# Patient Record
Sex: MALE | Race: WHITE | Employment: OTHER | URBAN - NONMETROPOLITAN AREA
[De-identification: names, ages, dates, MRNs, and addresses within clinical notes are randomized per-mention and may not be internally consistent; named-entity substitution may affect disease eponyms.]

---

## 2017-12-18 ENCOUNTER — HOSPITAL ENCOUNTER (EMERGENCY)
Age: 77
Discharge: HOME | End: 2017-12-19
Attending: EMERGENCY MEDICINE
Payer: COMMERCIAL

## 2017-12-18 VITALS
WEIGHT: 200 LBS | HEIGHT: 69 IN | TEMPERATURE: 98.4 F | BODY MASS INDEX: 29.62 KG/M2 | HEART RATE: 74 BPM | DIASTOLIC BLOOD PRESSURE: 81 MMHG | OXYGEN SATURATION: 93 % | SYSTOLIC BLOOD PRESSURE: 173 MMHG | RESPIRATION RATE: 16 BRPM

## 2017-12-18 DIAGNOSIS — T78.40XA ALLERGIC REACTION, INITIAL ENCOUNTER: Primary | ICD-10-CM

## 2017-12-18 PROCEDURE — 99283 EMERGENCY DEPT VISIT LOW MDM: CPT

## 2017-12-19 PROCEDURE — 6370000000 HC RX 637 (ALT 250 FOR IP): Performed by: EMERGENCY MEDICINE

## 2017-12-19 RX ORDER — PREDNISONE 20 MG/1
40 TABLET ORAL ONCE
Status: COMPLETED | OUTPATIENT
Start: 2017-12-19 | End: 2017-12-19

## 2017-12-19 RX ORDER — PREDNISONE 10 MG/1
TABLET ORAL
Qty: 20 TABLET | Refills: 0 | Status: SHIPPED | OUTPATIENT
Start: 2017-12-19 | End: 2017-12-29

## 2017-12-19 RX ORDER — CLINDAMYCIN HYDROCHLORIDE 150 MG/1
300 CAPSULE ORAL ONCE
Status: COMPLETED | OUTPATIENT
Start: 2017-12-19 | End: 2017-12-19

## 2017-12-19 RX ORDER — FAMOTIDINE 20 MG/1
40 TABLET, FILM COATED ORAL ONCE
Status: COMPLETED | OUTPATIENT
Start: 2017-12-19 | End: 2017-12-19

## 2017-12-19 RX ORDER — DIPHENHYDRAMINE HCL 25 MG
25 TABLET ORAL ONCE
Status: COMPLETED | OUTPATIENT
Start: 2017-12-19 | End: 2017-12-19

## 2017-12-19 RX ORDER — CLINDAMYCIN HYDROCHLORIDE 150 MG/1
300 CAPSULE ORAL 3 TIMES DAILY
Qty: 42 CAPSULE | Refills: 0 | Status: SHIPPED | OUTPATIENT
Start: 2017-12-19 | End: 2017-12-26

## 2017-12-19 RX ADMIN — PREDNISONE 40 MG: 20 TABLET ORAL at 00:10

## 2017-12-19 RX ADMIN — FAMOTIDINE 40 MG: 20 TABLET, FILM COATED ORAL at 00:10

## 2017-12-19 RX ADMIN — DIPHENHYDRAMINE HCL 25 MG: 25 TABLET ORAL at 00:10

## 2017-12-19 RX ADMIN — CLINDAMYCIN HYDROCHLORIDE 300 MG: 150 CAPSULE ORAL at 00:10

## 2017-12-19 ASSESSMENT — ENCOUNTER SYMPTOMS
VOMITING: 0
ABDOMINAL PAIN: 0
DIARRHEA: 0
NAUSEA: 0
SHORTNESS OF BREATH: 0
SORE THROAT: 0

## 2017-12-19 NOTE — ED PROVIDER NOTES
[facility] ED  [address] Patience Soriano  Phone: [phone]    eMERGENCY dEPARTMENT eNCOUnter          Pt Name: Fred Cobb  MRN: [id number]  [place] 1940  Date of evaluation: 12/18/2017      CHIEF COMPLAINT       Chief Complaint   Patient presents with    Oral Swelling     swelling to left lower lip for 4-5 hours. slight tingling also. denies weakness otherwise. no facial droop noted. started osteobiflex about 5 days ago. HISTORY OF PRESENT ILLNESS              Fred Cobb is a 69 y.o. male who presents with lower lip edema. States since he worse on the left. States it began about 4 hours prior to arrival.  Has not taken anything for the symptoms. Denies any neurologic symptoms to the head or extremities. Denies any trauma. States he did start taking a new medication about 5 days ago. Also reports switching to a new hand soap 2 days ago. Denies any difficulty breathing or swallowing. Denies any tongue edema. States his symptoms are primarily to his left lip. Denies any other symptoms or concerns. REVIEW OF SYSTEMS         Review of Systems   Constitutional: Negative for chills and fever. HENT: Negative for sore throat. Respiratory: Negative for shortness of breath. Cardiovascular: Negative for chest pain. Gastrointestinal: Negative for abdominal pain, diarrhea, nausea and vomiting. Genitourinary: Negative for difficulty urinating and dysuria. Musculoskeletal: Negative for neck pain. Skin: Negative for rash. Neurological: Negative for weakness and numbness. All other systems reviewed and are negative. PAST MEDICAL HISTORY    has a past medical history of Cellulitis; Chronic left SI joint pain; Diastasis recti; Hip bursitis; Left hip pain; Left leg pain; Osteoarthritis of spine; Pars defect of lumbar spine; Somatic dysfunction of lumbar region; and Ventral hernia.     SURGICAL HISTORY      has a past surgical history that includes Colonoscopy (2013); Cholecystectomy (2012); and hernia repair (2010). CURRENT MEDICATIONS       Discharge Medication List as of 2017 12:09 AM      CONTINUE these medications which have NOT CHANGED    Details   Misc Natural Products (OSTEO BI-FLEX TRIPLE STRENGTH PO) Take 1 tablet by mouth dailyHistorical Med      Multiple Vitamins-Minerals (OCUVITE EYE HEALTH FORMULA PO) Take 1 tablet by mouth daily      aspirin 81 MG tablet Take 81 mg by mouth daily. levothyroxine (SYNTHROID) 112 MCG tablet   Take 112 mcg by mouth daily              ALLERGIES     has No Known Allergies. FAMILY HISTORY     indicated that his mother is . He indicated that his father is . He indicated that all of his three sisters are alive. He indicated that his brother is alive. family history includes Cancer (age of onset: 58) in his mother; Heart Disease in his father; Other in his father. SOCIAL HISTORY      reports that he has never smoked. He has never used smokeless tobacco. He reports that he does not drink alcohol or use drugs. PHYSICAL EXAM     INITIAL VITALS:  height is 5' 8.5\" (1.74 m) and weight is 200 lb (90.7 kg). His tympanic temperature is 98.4 °F (36.9 °C). His blood pressure is 173/81 (abnormal) and his pulse is 74. His respiration is 16 and oxygen saturation is 93%. Physical Exam   Constitutional: He is oriented to person, place, and time and well-developed, well-nourished, and in no distress. He appears not lethargic, to not be writhing in pain, not malnourished and not dehydrated. He appears healthy. Non-toxic appearance. He does not have a sickly appearance. No distress. HENT:   Head: Normocephalic and atraumatic.    Right Ear: Tympanic membrane, external ear and ear canal normal.   Left Ear: Tympanic membrane, external ear and ear canal normal.   Nose: Nose normal.   Mouth/Throat: Uvula is midline, oropharynx is clear and moist and Disposition    Improved    PATIENT REFERRED TO:  Marlon [email or web address]  Kyle, [address]  [phone]    Schedule an appointment as soon as possible for a visit in 2 days        DISCHARGE MEDICATIONS:  Discharge Medication List as of 12/19/2017 12:09 AM      START taking these medications    Details   predniSONE (DELTASONE) 10 MG tablet Take 4 tablets by mouth once daily for 5 days, Disp-20 tablet, R-0Print      clindamycin (CLEOCIN) 150 MG capsule Take 2 capsules by mouth 3 times daily for 7 days, Disp-42 capsule, R-0Print             (Please note that portions of this note were completed with a voice recognition program.  Efforts were made to edit the dictations but occasionally words are mis-transcribed.)    Ashley Goodwin DO  Attending Emergency Physician            Ashley Goodwin DO  12/19/17 0023

## 2017-12-19 NOTE — ED NOTES
Discharge instructions and rx x2 given. Pt verbalized understanding. Home ambulatory with son. condition stable.      Minoo Daniels RN  12/19/17 0020

## 2022-10-11 ENCOUNTER — HOSPITAL ENCOUNTER (INPATIENT)
Age: 82
LOS: 2 days | Discharge: HOME | DRG: 195 | End: 2022-10-13
Attending: EMERGENCY MEDICINE | Admitting: INTERNAL MEDICINE
Payer: COMMERCIAL

## 2022-10-11 ENCOUNTER — APPOINTMENT (OUTPATIENT)
Dept: GENERAL RADIOLOGY | Age: 82
DRG: 195 | End: 2022-10-11
Payer: COMMERCIAL

## 2022-10-11 ENCOUNTER — APPOINTMENT (OUTPATIENT)
Dept: CT IMAGING | Age: 82
DRG: 195 | End: 2022-10-11
Payer: COMMERCIAL

## 2022-10-11 ENCOUNTER — APPOINTMENT (OUTPATIENT)
Dept: NON INVASIVE DIAGNOSTICS | Age: 82
DRG: 195 | End: 2022-10-11
Payer: COMMERCIAL

## 2022-10-11 DIAGNOSIS — E03.9 HYPOTHYROIDISM, UNSPECIFIED TYPE: ICD-10-CM

## 2022-10-11 DIAGNOSIS — J18.9 PNEUMONIA OF BOTH LUNGS DUE TO INFECTIOUS ORGANISM, UNSPECIFIED PART OF LUNG: Primary | ICD-10-CM

## 2022-10-11 LAB
ABSOLUTE EOS #: 0.46 K/UL (ref 0–0.44)
ABSOLUTE IMMATURE GRANULOCYTE: 0.06 K/UL (ref 0–0.3)
ABSOLUTE LYMPH #: 1.42 K/UL (ref 1.1–3.7)
ABSOLUTE MONO #: 0.34 K/UL (ref 0.1–1.2)
ALBUMIN SERPL-MCNC: 4.4 G/DL (ref 3.5–5.2)
ALBUMIN/GLOBULIN RATIO: 2.1 (ref 1–2.5)
ALP BLD-CCNC: 42 U/L (ref 40–129)
ALT SERPL-CCNC: 37 U/L (ref 5–41)
ANION GAP SERPL CALCULATED.3IONS-SCNC: 9 MMOL/L (ref 9–17)
AST SERPL-CCNC: 51 U/L
BACTERIA: NORMAL
BASOPHILS # BLD: 2 % (ref 0–2)
BASOPHILS ABSOLUTE: 0.09 K/UL (ref 0–0.2)
BILIRUB SERPL-MCNC: 0.8 MG/DL (ref 0.3–1.2)
BILIRUBIN URINE: NEGATIVE
BUN BLDV-MCNC: 15 MG/DL (ref 8–23)
BUN/CREAT BLD: 12 (ref 9–20)
CALCIUM SERPL-MCNC: 9.5 MG/DL (ref 8.6–10.4)
CHLORIDE BLD-SCNC: 101 MMOL/L (ref 98–107)
CO2: 28 MMOL/L (ref 20–31)
CREAT SERPL-MCNC: 1.29 MG/DL (ref 0.7–1.2)
EKG ATRIAL RATE: 63 BPM
EKG ATRIAL RATE: 67 BPM
EKG P AXIS: 15 DEGREES
EKG P AXIS: 40 DEGREES
EKG P-R INTERVAL: 220 MS
EKG P-R INTERVAL: 224 MS
EKG Q-T INTERVAL: 424 MS
EKG Q-T INTERVAL: 438 MS
EKG QRS DURATION: 108 MS
EKG QRS DURATION: 110 MS
EKG QTC CALCULATION (BAZETT): 448 MS
EKG QTC CALCULATION (BAZETT): 448 MS
EKG R AXIS: -18 DEGREES
EKG R AXIS: -21 DEGREES
EKG T AXIS: -2 DEGREES
EKG T AXIS: 1 DEGREES
EKG VENTRICULAR RATE: 63 BPM
EKG VENTRICULAR RATE: 67 BPM
EOSINOPHILS RELATIVE PERCENT: 8 % (ref 1–4)
EPITHELIAL CELLS UA: NORMAL /HPF (ref 0–5)
GFR SERPL CREATININE-BSD FRML MDRD: 56 ML/MIN/1.73M2
GLUCOSE BLD-MCNC: 93 MG/DL (ref 70–99)
GLUCOSE URINE: NEGATIVE
HCT VFR BLD CALC: 31.9 % (ref 40.7–50.3)
HEMOGLOBIN: 10.5 G/DL (ref 13–17)
IMMATURE GRANULOCYTES: 1 %
KETONES, URINE: NEGATIVE
LACTIC ACID, SEPSIS: 0.8 MMOL/L (ref 0.5–1.9)
LEUKOCYTE ESTERASE, URINE: NEGATIVE
LV EF: 60 %
LVEF MODALITY: NORMAL
LYMPHOCYTES # BLD: 24 % (ref 24–43)
MAGNESIUM: 2.3 MG/DL (ref 1.6–2.6)
MCH RBC QN AUTO: 31.3 PG (ref 25.2–33.5)
MCHC RBC AUTO-ENTMCNC: 32.9 G/DL (ref 25.2–33.5)
MCV RBC AUTO: 95.2 FL (ref 82.6–102.9)
MONOCYTES # BLD: 6 % (ref 3–12)
NITRITE, URINE: NEGATIVE
NRBC AUTOMATED: 0 PER 100 WBC
PDW BLD-RTO: 15.2 % (ref 11.8–14.4)
PH UA: 6 (ref 5–6)
PLATELET # BLD: 229 K/UL (ref 138–453)
PMV BLD AUTO: 9 FL (ref 8.1–13.5)
POTASSIUM SERPL-SCNC: 3.4 MMOL/L (ref 3.7–5.3)
POTASSIUM SERPL-SCNC: 3.6 MMOL/L (ref 3.7–5.3)
PROTEIN UA: ABNORMAL
RBC # BLD: 3.35 M/UL (ref 4.21–5.77)
RBC # BLD: ABNORMAL 10*6/UL
RBC UA: NORMAL /HPF (ref 0–4)
SARS-COV-2, RAPID: NOT DETECTED
SEG NEUTROPHILS: 59 % (ref 36–65)
SEGMENTED NEUTROPHILS ABSOLUTE COUNT: 3.67 K/UL (ref 1.5–8.1)
SODIUM BLD-SCNC: 138 MMOL/L (ref 135–144)
SPECIFIC GRAVITY UA: 1.03 (ref 1.01–1.02)
SPECIMEN DESCRIPTION: NORMAL
TOTAL PROTEIN: 6.5 G/DL (ref 6.4–8.3)
TROPONIN, HIGH SENSITIVITY: 48 NG/L (ref 0–22)
TROPONIN, HIGH SENSITIVITY: 49 NG/L (ref 0–22)
TROPONIN, HIGH SENSITIVITY: 53 NG/L (ref 0–22)
TROPONIN, HIGH SENSITIVITY: 56 NG/L (ref 0–22)
TSH SERPL DL<=0.05 MIU/L-ACNC: 201.2 UIU/ML (ref 0.3–5)
URINE HGB: NEGATIVE
UROBILINOGEN, URINE: NORMAL
WBC # BLD: 6 K/UL (ref 3.5–11.3)
WBC UA: NORMAL /HPF (ref 0–4)

## 2022-10-11 PROCEDURE — 81001 URINALYSIS AUTO W/SCOPE: CPT

## 2022-10-11 PROCEDURE — 2709999900 CT CHEST PULMONARY EMBOLISM W CONTRAST

## 2022-10-11 PROCEDURE — 99222 1ST HOSP IP/OBS MODERATE 55: CPT | Performed by: INTERNAL MEDICINE

## 2022-10-11 PROCEDURE — 93005 ELECTROCARDIOGRAM TRACING: CPT | Performed by: EMERGENCY MEDICINE

## 2022-10-11 PROCEDURE — 84443 ASSAY THYROID STIM HORMONE: CPT

## 2022-10-11 PROCEDURE — 36415 COLL VENOUS BLD VENIPUNCTURE: CPT

## 2022-10-11 PROCEDURE — 84439 ASSAY OF FREE THYROXINE: CPT

## 2022-10-11 PROCEDURE — 84132 ASSAY OF SERUM POTASSIUM: CPT

## 2022-10-11 PROCEDURE — 6370000000 HC RX 637 (ALT 250 FOR IP): Performed by: INTERNAL MEDICINE

## 2022-10-11 PROCEDURE — 84484 ASSAY OF TROPONIN QUANT: CPT

## 2022-10-11 PROCEDURE — 71045 X-RAY EXAM CHEST 1 VIEW: CPT

## 2022-10-11 PROCEDURE — 87635 SARS-COV-2 COVID-19 AMP PRB: CPT

## 2022-10-11 PROCEDURE — 85025 COMPLETE CBC W/AUTO DIFF WBC: CPT

## 2022-10-11 PROCEDURE — 94640 AIRWAY INHALATION TREATMENT: CPT

## 2022-10-11 PROCEDURE — 87040 BLOOD CULTURE FOR BACTERIA: CPT

## 2022-10-11 PROCEDURE — 93306 TTE W/DOPPLER COMPLETE: CPT

## 2022-10-11 PROCEDURE — 83735 ASSAY OF MAGNESIUM: CPT

## 2022-10-11 PROCEDURE — 80053 COMPREHEN METABOLIC PANEL: CPT

## 2022-10-11 PROCEDURE — 6360000002 HC RX W HCPCS: Performed by: EMERGENCY MEDICINE

## 2022-10-11 PROCEDURE — 99285 EMERGENCY DEPT VISIT HI MDM: CPT

## 2022-10-11 PROCEDURE — 2580000003 HC RX 258: Performed by: EMERGENCY MEDICINE

## 2022-10-11 PROCEDURE — 71260 CT THORAX DX C+: CPT | Performed by: EMERGENCY MEDICINE

## 2022-10-11 PROCEDURE — 70450 CT HEAD/BRAIN W/O DYE: CPT

## 2022-10-11 PROCEDURE — 2060000000 HC ICU INTERMEDIATE R&B

## 2022-10-11 PROCEDURE — 6360000004 HC RX CONTRAST MEDICATION: Performed by: EMERGENCY MEDICINE

## 2022-10-11 PROCEDURE — 94761 N-INVAS EAR/PLS OXIMETRY MLT: CPT

## 2022-10-11 PROCEDURE — 2700000000 HC OXYGEN THERAPY PER DAY

## 2022-10-11 PROCEDURE — 6360000002 HC RX W HCPCS: Performed by: INTERNAL MEDICINE

## 2022-10-11 PROCEDURE — 2580000003 HC RX 258: Performed by: INTERNAL MEDICINE

## 2022-10-11 PROCEDURE — 83605 ASSAY OF LACTIC ACID: CPT

## 2022-10-11 RX ORDER — ATORVASTATIN CALCIUM 10 MG/1
10 TABLET, FILM COATED ORAL NIGHTLY
Status: DISCONTINUED | OUTPATIENT
Start: 2022-10-11 | End: 2022-10-13 | Stop reason: HOSPADM

## 2022-10-11 RX ORDER — ONDANSETRON 2 MG/ML
4 INJECTION INTRAMUSCULAR; INTRAVENOUS EVERY 6 HOURS PRN
Status: DISCONTINUED | OUTPATIENT
Start: 2022-10-11 | End: 2022-10-13 | Stop reason: HOSPADM

## 2022-10-11 RX ORDER — LEVOTHYROXINE SODIUM 112 UG/1
112 TABLET ORAL DAILY
Status: DISCONTINUED | OUTPATIENT
Start: 2022-10-11 | End: 2022-10-13 | Stop reason: HOSPADM

## 2022-10-11 RX ORDER — SODIUM CHLORIDE 0.9 % (FLUSH) 0.9 %
10 SYRINGE (ML) INJECTION PRN
Status: DISCONTINUED | OUTPATIENT
Start: 2022-10-11 | End: 2022-10-13 | Stop reason: HOSPADM

## 2022-10-11 RX ORDER — COLCHICINE 0.6 MG/1
TABLET ORAL
COMMUNITY
Start: 2021-10-05

## 2022-10-11 RX ORDER — ATORVASTATIN CALCIUM 10 MG/1
TABLET, FILM COATED ORAL
COMMUNITY
Start: 2021-10-05

## 2022-10-11 RX ORDER — POTASSIUM CHLORIDE 20 MEQ/1
40 TABLET, EXTENDED RELEASE ORAL ONCE
Status: COMPLETED | OUTPATIENT
Start: 2022-10-11 | End: 2022-10-11

## 2022-10-11 RX ORDER — ALBUTEROL SULFATE 2.5 MG/3ML
2.5 SOLUTION RESPIRATORY (INHALATION)
Status: DISCONTINUED | OUTPATIENT
Start: 2022-10-11 | End: 2022-10-13 | Stop reason: HOSPADM

## 2022-10-11 RX ORDER — SODIUM CHLORIDE 0.9 % (FLUSH) 0.9 %
10 SYRINGE (ML) INJECTION EVERY 12 HOURS SCHEDULED
Status: DISCONTINUED | OUTPATIENT
Start: 2022-10-11 | End: 2022-10-13 | Stop reason: HOSPADM

## 2022-10-11 RX ORDER — SODIUM CHLORIDE FOR INHALATION 0.9 %
3 VIAL, NEBULIZER (ML) INHALATION
Status: DISCONTINUED | OUTPATIENT
Start: 2022-10-11 | End: 2022-10-13 | Stop reason: HOSPADM

## 2022-10-11 RX ORDER — ENOXAPARIN SODIUM 100 MG/ML
40 INJECTION SUBCUTANEOUS DAILY
Status: DISCONTINUED | OUTPATIENT
Start: 2022-10-11 | End: 2022-10-13 | Stop reason: HOSPADM

## 2022-10-11 RX ORDER — SODIUM CHLORIDE 9 MG/ML
INJECTION, SOLUTION INTRAVENOUS PRN
Status: DISCONTINUED | OUTPATIENT
Start: 2022-10-11 | End: 2022-10-13 | Stop reason: HOSPADM

## 2022-10-11 RX ORDER — IPRATROPIUM BROMIDE AND ALBUTEROL SULFATE 2.5; .5 MG/3ML; MG/3ML
1 SOLUTION RESPIRATORY (INHALATION)
Status: DISCONTINUED | OUTPATIENT
Start: 2022-10-11 | End: 2022-10-13 | Stop reason: HOSPADM

## 2022-10-11 RX ORDER — ASPIRIN 81 MG/1
81 TABLET ORAL DAILY
Status: DISCONTINUED | OUTPATIENT
Start: 2022-10-12 | End: 2022-10-13 | Stop reason: HOSPADM

## 2022-10-11 RX ORDER — ACETAMINOPHEN 325 MG/1
650 TABLET ORAL EVERY 4 HOURS PRN
Status: DISCONTINUED | OUTPATIENT
Start: 2022-10-11 | End: 2022-10-13 | Stop reason: HOSPADM

## 2022-10-11 RX ORDER — 0.9 % SODIUM CHLORIDE 0.9 %
1000 INTRAVENOUS SOLUTION INTRAVENOUS ONCE
Status: COMPLETED | OUTPATIENT
Start: 2022-10-11 | End: 2022-10-11

## 2022-10-11 RX ADMIN — AZITHROMYCIN MONOHYDRATE 500 MG: 500 INJECTION, POWDER, LYOPHILIZED, FOR SOLUTION INTRAVENOUS at 12:46

## 2022-10-11 RX ADMIN — ATORVASTATIN CALCIUM 10 MG: 10 TABLET, FILM COATED ORAL at 21:26

## 2022-10-11 RX ADMIN — CEFTRIAXONE 1000 MG: 1 INJECTION, POWDER, FOR SOLUTION INTRAMUSCULAR; INTRAVENOUS at 12:46

## 2022-10-11 RX ADMIN — ENOXAPARIN SODIUM 40 MG: 100 INJECTION SUBCUTANEOUS at 15:03

## 2022-10-11 RX ADMIN — SODIUM CHLORIDE, PRESERVATIVE FREE 10 ML: 5 INJECTION INTRAVENOUS at 21:26

## 2022-10-11 RX ADMIN — LEVOTHYROXINE SODIUM 112 MCG: 0.11 TABLET ORAL at 15:03

## 2022-10-11 RX ADMIN — IPRATROPIUM BROMIDE AND ALBUTEROL SULFATE 1 AMPULE: .5; 3 SOLUTION RESPIRATORY (INHALATION) at 20:20

## 2022-10-11 RX ADMIN — IOPAMIDOL 80 ML: 755 INJECTION, SOLUTION INTRAVENOUS at 10:41

## 2022-10-11 RX ADMIN — SODIUM CHLORIDE 1000 ML: 9 INJECTION, SOLUTION INTRAVENOUS at 10:21

## 2022-10-11 RX ADMIN — IPRATROPIUM BROMIDE AND ALBUTEROL SULFATE 1 AMPULE: .5; 3 SOLUTION RESPIRATORY (INHALATION) at 16:24

## 2022-10-11 RX ADMIN — POTASSIUM CHLORIDE 40 MEQ: 1500 TABLET, EXTENDED RELEASE ORAL at 15:03

## 2022-10-11 ASSESSMENT — ENCOUNTER SYMPTOMS
TROUBLE SWALLOWING: 0
WHEEZING: 0
DIARRHEA: 0
VOMITING: 0
CONSTIPATION: 0
BLOOD IN STOOL: 0
SORE THROAT: 0
NAUSEA: 0
BACK PAIN: 0
ABDOMINAL PAIN: 0
SHORTNESS OF BREATH: 0

## 2022-10-11 ASSESSMENT — LIFESTYLE VARIABLES
HOW OFTEN DO YOU HAVE A DRINK CONTAINING ALCOHOL: NEVER
HOW OFTEN DO YOU HAVE A DRINK CONTAINING ALCOHOL: NEVER
HOW MANY STANDARD DRINKS CONTAINING ALCOHOL DO YOU HAVE ON A TYPICAL DAY: PATIENT DOES NOT DRINK
HOW MANY STANDARD DRINKS CONTAINING ALCOHOL DO YOU HAVE ON A TYPICAL DAY: PATIENT DOES NOT DRINK

## 2022-10-11 ASSESSMENT — PAIN - FUNCTIONAL ASSESSMENT: PAIN_FUNCTIONAL_ASSESSMENT: NONE - DENIES PAIN

## 2022-10-11 NOTE — PROGRESS NOTES
Incentive Spirometry education and demonstration given by Respiratory Therapy. Pt achieving 3000 mL at time of instruction. Incentive Spirometer left at bedside and   Patient instructed to do a minimum of 10 breaths every hour.       Gary Gonsalez, NHAN  4:26 PM

## 2022-10-11 NOTE — H&P
HOSPITALIST ADMISSION H&P      REASON FOR ADMISSION:     Pneumonia--PNA  ESTIMATED LENGTH OF STAY:    > 2 midnights---2-3 days    ATTENDING/ADMITTING PHYSICIAN: Helga Navarrete MD MD  PCP: Regina Koroma MD    HISTORY OF PRESENT ILLNESS:      The patient is a [de-identified] y.o. 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 Patient qaib-sjuqgnsgeh-iuiqbsdh-available records reviewed, including, but not limited to,  ER reports--labs--imaging---EKGs---office records.        Past Medical History:   Diagnosis Date    Cellulitis     Chronic left SI joint pain     Diastasis recti     Hip bursitis     Left hip pain     Left leg pain     Osteoarthritis of spine     Pars defect of lumbar spine     Somatic dysfunction of lumbar region     Ventral hernia            Past Surgical History:   Procedure Laterality Date    CHOLECYSTECTOMY  02/22/2012    COLONOSCOPY  12/13/2013    HERNIA REPAIR  54/99/9269    umbilical herniua repair       Medications Prior to Admission:    Medications Prior to Admission: atorvastatin (LIPITOR) 10 MG tablet, take 1 tablet by mouth at bedtime (Patient not taking: Reported on 10/11/2022)  colchicine (COLCRYS) 0.6 MG tablet, TAKE 2 TABLETS BY MOUTH NOW, THEN 1 TABLET ONE HOUR LATER, THEN 1 TABLET DAILY THEREAFTER UNTIL RESOLVED (Patient not taking: Reported on 10/11/2022)  Misc Natural Products (OSTEO BI-FLEX TRIPLE STRENGTH PO), Take 1 tablet by mouth daily  Multiple Vitamins-Minerals ([address]), Take 1 tablet by mouth daily (Patient not taking: Reported on 10/11/2022)  aspirin 81 MG tablet, Take 81 mg by mouth daily.  levothyroxine (SYNTHROID) 112 MCG tablet,  Take 112 mcg by mouth daily  (Patient not taking: Reported on 10/11/2022)    Allergies:    Patient has no known allergies. Social History:    reports that he has never smoked. He has never used smokeless tobacco. He reports that he does not drink alcohol and does not use drugs. Family History:   family history includes Cancer (age of onset: 58) in his mother; Heart Disease in his father; Other in his father. REVIEW OF SYSTEMS:  See HPI and problem list; otherwise no other new complaints with respect to HEENT, neck, pulmonary, coronary, GI, , endocrine, musculoskeletal, immune system/connective tissue disease, hematologic, neuropsych, skin, lymphatics, or malignancies. PHYSICAL EXAM:  Vitals:  /76   Pulse 68   Temp 97.4 °F (36.3 °C) (Tympanic)   Resp 18   Ht 5' 8.5\" (1.74 m)   Wt 195 lb 6.4 oz (88.6 kg)   SpO2 96%   BMI 29.28 kg/m²     HEENT: Normocephalic and Atraumatic  Neck: Supple, No Masses, Tenderness, Nodularity, and No Lymphadenopathy  Chest/Lungs:  crackles bases--- and Distant Breath Sounds  Cardiac: Regular Rate and Rhythm  GI/Abdomen:  Bowel Sounds Present and Soft, Non-tender, without Guarding or Rebound Tenderness  : Not examined  EXT/Skin: No Edema, No Cyanosis, and No Clubbing  Neuro:  alert---slow responses---generalized weakness        LABS:    CBC with Differential:    Lab Results   Component Value Date/Time    WBC 6.0 10/11/2022 08:56 AM    RBC 3.35 10/11/2022 08:56 AM    HGB 10.5 10/11/2022 08:56 AM    HCT 31.9 10/11/2022 08:56 AM     10/11/2022 08:56 AM    MCV 95.2 10/11/2022 08:56 AM    MCH 31.3 10/11/2022 08:56 AM    MCHC 32.9 10/11/2022 08:56 AM    RDW 15.2 10/11/2022 08:56 AM    LYMPHOPCT 24 10/11/2022 08:56 AM    MONOPCT 6 10/11/2022 08:56 AM    BASOPCT 2 10/11/2022 08:56 AM    MONOSABS 0.34 10/11/2022 08:56 AM    LYMPHSABS 1.42 10/11/2022 08:56 AM    EOSABS 0.46 10/11/2022 08:56 AM    BASOSABS 0.09 10/11/2022 08:56 AM     BMP:    Lab Results   Component Value Date/Time     10/11/2022 08:56 AM    K 3.6 10/11/2022 08:56 AM     10/11/2022 08:56 AM    CO2 28 10/11/2022 08:56 AM    BUN 15 10/11/2022 08:56 AM    LABALBU 4.4 10/11/2022 08:56 AM    CREATININE 1.29 10/11/2022 08:56 AM    CALCIUM 9.5 10/11/2022 08:56 AM    LABGLOM 56 10/11/2022 08:56 AM    GLUCOSE 93 10/11/2022 08:56 AM       ASSESSMENT:      Patient Active Problem List   Diagnosis    Community acquired bilateral lower lobe pneumonia       BEAN BLANK  [de-identified] WM   [JAMIE Holloway----[place]]  FULL CODE   COVID-19---NEGATIVE    Anti-infectives:    Rocephin IV, Zithromax IV    Pneumonia---PNA----bilateral bibasilar---10.11.2022  Hypoxia----10.11.2022  Elevated troponin----10.11.2022----downward course---56---53  Weakness--fatigue             EKG----10.111.2022----NSR---67--1st degree AVB--incomplete RBBB--no change from 2016          CXR----10.11.2022-----NACs          CT head---10.11.2022---no MBS--remote tiny lacunar infarction left thalamus           CTA chest--pulmonary---10.11.2022---no PE--mild bronchial wall thickening---scattered                                 interstitial opacities bilateral lower lobes---hepatic cysts--borderline enlarged                                 right hilar LN--likely reactive----mildly patulous esophagus    Hypothyroidism---uncontrolled---TSH ~ 200----10.11.2022  Cerebrovascular disease          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   Difficulty walking and speaking--due to above   CKD---Stage 3a  Hyperlipidemia  PMH:   cellulitis, chronic left SIJ pain, diastasis recti, hip bursitis, left hip-leg pain,               OA spine, pars defect lumbar spine, somatic dysfunction lumbar region,              ventral hernia, hyperuricemia--gout   PSH:    colonoscopy----2013, cholecystectomy--2012, hernia repair---ventral---2010    Allergies:   NKDA    Code Status: FULL CODE  OARRS----10.11.2022--[-]        PLAN:       PNA---IV Rocephin---Zithromax---O2----med nebs     Hypoxia supplemental oxygen---goal O2 sat 90-95%     Hypothyroidism----restart Synthroid 112 daily     Home medications reviewed  5.       See orders     Note that over 50 minutes was spent in evaluation of the patient, review of the chart and pertinent records, discussion with family/staff, etc    Salbador Michaesl MD MD  2:39 PM  10/11/2022

## 2022-10-11 NOTE — ED PROVIDER NOTES
[facility]  eMERGENCY dEPARTMENT eNCOUnter      Pt Name: Tommy Shah  MRN: [id number]  [place] 1940  Date of evaluation: 10/11/2022      CHIEF COMPLAINT       Chief Complaint   Patient presents with    [id number] [facility] [address] is a [de-identified] y.o. male who presents with chief complaint of difficulty speaking and just global weakness this has been going on for over a week he had a stroke several years ago and affected his speech he says he just feels a bit worse no headaches no chest pain no shortness of breath he did have severe chills 2 nights ago he tells me. Wife says he spends most of his time sleeping there is been no falls no focal weakness      REVIEW OF SYSTEMS         Review of Systems   Constitutional:  Positive for chills and fatigue. Negative for fever. HENT:  Negative for congestion, dental problem, sore throat and trouble swallowing. Eyes:  Negative for visual disturbance. Respiratory:  Negative for shortness of breath and wheezing. Cardiovascular:  Negative for chest pain, palpitations and leg swelling. Gastrointestinal:  Negative for abdominal pain, blood in stool, constipation, diarrhea, nausea and vomiting. Genitourinary:  Negative for difficulty urinating, dysuria and testicular pain. Musculoskeletal:  Negative for back pain, joint swelling and neck pain. Skin:  Negative for rash. Neurological:  Positive for speech difficulty. Negative for dizziness, syncope, weakness and headaches. Hematological:  Negative for adenopathy. Does not bruise/bleed easily. Psychiatric/Behavioral:  Negative for confusion and suicidal ideas. PAST MEDICAL HISTORY    has a past medical history of Cellulitis, Chronic left SI joint pain, Diastasis recti, Hip bursitis, Left hip pain, Left leg pain, Osteoarthritis of spine, Pars defect of lumbar spine, Somatic dysfunction of lumbar region, and Ventral hernia.     SURGICAL HISTORY      has a past surgical history that includes Colonoscopy (2013); Cholecystectomy (2012); and hernia repair (2010). CURRENT MEDICATIONS       Previous Medications    ASPIRIN 81 MG TABLET    Take 81 mg by mouth daily. ATORVASTATIN (LIPITOR) 10 MG TABLET    take 1 tablet by mouth at bedtime    COLCHICINE (COLCRYS) 0.6 MG TABLET    TAKE 2 TABLETS BY MOUTH NOW, THEN 1 TABLET ONE HOUR LATER, THEN 1 TABLET DAILY THEREAFTER UNTIL RESOLVED    LEVOTHYROXINE (SYNTHROID) 112 MCG TABLET      Take 112 mcg by mouth daily     MISC NATURAL PRODUCTS (OSTEO BI-FLEX TRIPLE STRENGTH PO)    Take 1 tablet by mouth daily    MULTIPLE VITAMINS-MINERALS (OCUVITE EYE HEALTH FORMULA PO)    Take 1 tablet by mouth daily       ALLERGIES     has No Known Allergies. FAMILY HISTORY     He indicated that his mother is . He indicated that his father is . He indicated that all of his three sisters are alive. He indicated that his brother is alive. family history includes Cancer (age of onset: 58) in his mother; Heart Disease in his father; Other in his father. SOCIAL HISTORY      reports that he has never smoked. He has never used smokeless tobacco. He reports that he does not drink alcohol and does not use drugs. PHYSICAL EXAM     INITIAL VITALS:  height is 5' 8.5\" (1.74 m) and weight is 178 lb (80.7 kg). His tympanic temperature is 98.1 °F (36.7 °C). His blood pressure is 115/73 and his pulse is 61. His respiration is 17 and oxygen saturation is 97%. Physical Exam  Constitutional:       Appearance: He is well-developed. HENT:      Head: Normocephalic and atraumatic. Right Ear: External ear normal.      Left Ear: External ear normal.   Eyes:      Pupils: Pupils are equal, round, and reactive to light. Cardiovascular:      Rate and Rhythm: Normal rate and regular rhythm. Pulmonary:      Effort: Pulmonary effort is normal.      Breath sounds: Normal breath sounds.    Abdominal:      General: Bowel sounds are normal.      Palpations: Abdomen is soft. Musculoskeletal:         General: Normal range of motion. Cervical back: Normal range of motion and neck supple. Skin:     General: Skin is warm and dry. Neurological:      General: No focal deficit present. Mental Status: He is alert and oriented to person, place, and time. Comments: Speech is slow but appropriate I find no focal motor weakness cranial nerves are grossly intact no pronator drift finger-nose smoothly performed   Psychiatric:         Behavior: Behavior normal.         DIFFERENTIAL DIAGNOSIS/ MDM:     Fatigue increased sleeping we will do work-up    DIAGNOSTIC RESULTS     EKG: All EKG's are interpreted by the Emergency Department Physician who either signs or Co-signs this chart in the absence of a cardiologist.  Normal sinus rhythm rate of 67 bpm MN interval is prolonged at 220 ms giving him a first-degree AV block QRS durations 110 ms QT corrected 448 ms axis is -18 there is no acute ST or T wave changes he does have an incomplete right bundle branch block  EKG is unchanged from prior in 2016    RADIOLOGY:   I directly visualized the following  images and reviewed the radiologist interpretations:       EXAMINATION:   ONE XRAY VIEW OF THE CHEST       10/11/2022 9:19 am       COMPARISON:   None. HISTORY:   ORDERING SYSTEM PROVIDED HISTORY: Weakness   TECHNOLOGIST PROVIDED HISTORY:   Weakness   Reason for Exam: weakness       FINDINGS:   Cardiomediastinal silhouette and pulmonary vasculature are within normal   limits. No focal airspace consolidation, pneumothorax, or pleural effusion. No free air beneath the diaphragm. No acute osseous abnormality. Impression   No acute intrathoracic process.             EXAMINATION:   CT OF THE HEAD WITHOUT CONTRAST  10/11/2022 9:56 am       TECHNIQUE:   CT of the head was performed without the administration of intravenous   contrast. Automated exposure control, within normal limits   TROPONIN - Abnormal; Notable for the following components:    Troponin, High Sensitivity 56 (*)     All other components within normal limits   URINALYSIS WITH REFLEX TO CULTURE - Abnormal; Notable for the following components:    Specific Gravity, UA 1.030 (*)     Protein, UA TRACE (*)     All other components within normal limits   TROPONIN - Abnormal; Notable for the following components:    Troponin, High Sensitivity 53 (*)     All other components within normal limits   TSH WITH REFLEX - Abnormal; Notable for the following components:    .20 (*)     All other components within normal limits   COVID-19, RAPID   CULTURE, BLOOD 1   CULTURE, BLOOD 1   MICROSCOPIC URINALYSIS   T4, FREE   LACTATE, SEPSIS   LACTATE, SEPSIS           EMERGENCY DEPARTMENT COURSE:   Vitals:    Vitals:    10/11/22 0930 10/11/22 0945 10/11/22 1015 10/11/22 1030   BP: 107/64 108/73 105/76 115/73   Pulse: 65 63 66 61   Resp: 18 18 24 17   Temp:       TempSrc:       SpO2: 91% 93% 91% 97%   Weight:       Height:         -------------------------  BP: 115/73, Temp: 98.1 °F (36.7 °C), Heart Rate: 61, Resp: 17        Re-evaluation Notes    Resting comfortably sats remained in the low 90s and nasal O2, patient has admitted to stopping his Synthroid because he no longer needs it which may be a component o repeat EKG shows sinus rhythm with first-degree AV block rate of 63 bpm HI interval is 224 ms QRS durations 108 ms QT corrected 448 ms axis is -21 there is no acute ST or T wave changes he does have the RSR prime which has had before f his fatigue    CRITICAL CARE:   IP CONSULT TO HOSPITALIST        CONSULTS:      PROCEDURES:  None    FINAL IMPRESSION      1. Pneumonia of both lungs due to infectious organism, unspecified part of lung    2.  Hypothyroidism, unspecified type          DISPOSITION/PLAN   DISPOSITION Decision To Admit    Condition on Disposition    Stable    PATIENT REFERRED TO:  No follow-up provider specified. DISCHARGE MEDICATIONS:  New Prescriptions    No medications on file       (Please note that portions of this note were completed with a voice recognition program.  Efforts were made to edit the dictations but occasionally words are mis-transcribed.)    Anca Vieira MD,, MD, F.A.A.E.M.   Attending Emergency Physician                           Anca Vieira MD  10/11/22 9901

## 2022-10-12 ENCOUNTER — APPOINTMENT (OUTPATIENT)
Dept: GENERAL RADIOLOGY | Age: 82
DRG: 195 | End: 2022-10-12
Payer: COMMERCIAL

## 2022-10-12 LAB
ABSOLUTE EOS #: 0.41 K/UL (ref 0–0.44)
ABSOLUTE IMMATURE GRANULOCYTE: 0.05 K/UL (ref 0–0.3)
ABSOLUTE LYMPH #: 1.38 K/UL (ref 1.1–3.7)
ABSOLUTE MONO #: 0.4 K/UL (ref 0.1–1.2)
ANION GAP SERPL CALCULATED.3IONS-SCNC: 6 MMOL/L (ref 9–17)
BASOPHILS # BLD: 1 % (ref 0–2)
BASOPHILS ABSOLUTE: 0.07 K/UL (ref 0–0.2)
BUN BLDV-MCNC: 12 MG/DL (ref 8–23)
BUN/CREAT BLD: 10 (ref 9–20)
CALCIUM SERPL-MCNC: 9 MG/DL (ref 8.6–10.4)
CHLORIDE BLD-SCNC: 105 MMOL/L (ref 98–107)
CO2: 28 MMOL/L (ref 20–31)
CREAT SERPL-MCNC: 1.26 MG/DL (ref 0.7–1.2)
EKG ATRIAL RATE: 67 BPM
EKG P-R INTERVAL: 186 MS
EKG Q-T INTERVAL: 408 MS
EKG QRS DURATION: 96 MS
EKG QTC CALCULATION (BAZETT): 431 MS
EKG R AXIS: -11 DEGREES
EKG T AXIS: 8 DEGREES
EKG VENTRICULAR RATE: 67 BPM
EOSINOPHILS RELATIVE PERCENT: 7 % (ref 1–4)
GFR SERPL CREATININE-BSD FRML MDRD: 58 ML/MIN/1.73M2
GLUCOSE BLD-MCNC: 85 MG/DL (ref 70–99)
HCT VFR BLD CALC: 27.1 % (ref 40.7–50.3)
HEMOGLOBIN: 9.1 G/DL (ref 13–17)
IMMATURE GRANULOCYTES: 1 %
LYMPHOCYTES # BLD: 22 % (ref 24–43)
MCH RBC QN AUTO: 32.5 PG (ref 25.2–33.5)
MCHC RBC AUTO-ENTMCNC: 33.6 G/DL (ref 25.2–33.5)
MCV RBC AUTO: 96.8 FL (ref 82.6–102.9)
MONOCYTES # BLD: 7 % (ref 3–12)
NRBC AUTOMATED: 0 PER 100 WBC
PDW BLD-RTO: 15.4 % (ref 11.8–14.4)
PLATELET # BLD: 210 K/UL (ref 138–453)
PMV BLD AUTO: 9.4 FL (ref 8.1–13.5)
POTASSIUM SERPL-SCNC: 3.7 MMOL/L (ref 3.7–5.3)
RBC # BLD: 2.8 M/UL (ref 4.21–5.77)
RBC # BLD: ABNORMAL 10*6/UL
SEG NEUTROPHILS: 62 % (ref 36–65)
SEGMENTED NEUTROPHILS ABSOLUTE COUNT: 3.85 K/UL (ref 1.5–8.1)
SODIUM BLD-SCNC: 139 MMOL/L (ref 135–144)
WBC # BLD: 6.2 K/UL (ref 3.5–11.3)

## 2022-10-12 PROCEDURE — 71045 X-RAY EXAM CHEST 1 VIEW: CPT

## 2022-10-12 PROCEDURE — 2060000000 HC ICU INTERMEDIATE R&B

## 2022-10-12 PROCEDURE — 93005 ELECTROCARDIOGRAM TRACING: CPT | Performed by: INTERNAL MEDICINE

## 2022-10-12 PROCEDURE — 99223 1ST HOSP IP/OBS HIGH 75: CPT | Performed by: INTERNAL MEDICINE

## 2022-10-12 PROCEDURE — 94640 AIRWAY INHALATION TREATMENT: CPT

## 2022-10-12 PROCEDURE — 80048 BASIC METABOLIC PNL TOTAL CA: CPT

## 2022-10-12 PROCEDURE — 6370000000 HC RX 637 (ALT 250 FOR IP): Performed by: INTERNAL MEDICINE

## 2022-10-12 PROCEDURE — 97161 PT EVAL LOW COMPLEX 20 MIN: CPT

## 2022-10-12 PROCEDURE — 2580000003 HC RX 258: Performed by: INTERNAL MEDICINE

## 2022-10-12 PROCEDURE — 36415 COLL VENOUS BLD VENIPUNCTURE: CPT

## 2022-10-12 PROCEDURE — 85025 COMPLETE CBC W/AUTO DIFF WBC: CPT

## 2022-10-12 PROCEDURE — 6360000002 HC RX W HCPCS: Performed by: INTERNAL MEDICINE

## 2022-10-12 PROCEDURE — 94760 N-INVAS EAR/PLS OXIMETRY 1: CPT

## 2022-10-12 PROCEDURE — 99232 SBSQ HOSP IP/OBS MODERATE 35: CPT | Performed by: INTERNAL MEDICINE

## 2022-10-12 RX ORDER — LACTULOSE 10 G/15ML
30 SOLUTION ORAL 4 TIMES DAILY
Status: DISCONTINUED | OUTPATIENT
Start: 2022-10-12 | End: 2022-10-13 | Stop reason: HOSPADM

## 2022-10-12 RX ADMIN — ACETAMINOPHEN 650 MG: 325 TABLET ORAL at 04:16

## 2022-10-12 RX ADMIN — LACTULOSE 30 G: 20 SOLUTION ORAL at 16:54

## 2022-10-12 RX ADMIN — ATORVASTATIN CALCIUM 10 MG: 10 TABLET, FILM COATED ORAL at 21:17

## 2022-10-12 RX ADMIN — IPRATROPIUM BROMIDE AND ALBUTEROL SULFATE 1 AMPULE: .5; 3 SOLUTION RESPIRATORY (INHALATION) at 15:54

## 2022-10-12 RX ADMIN — SODIUM CHLORIDE, PRESERVATIVE FREE 10 ML: 5 INJECTION INTRAVENOUS at 21:17

## 2022-10-12 RX ADMIN — CEFTRIAXONE 1000 MG: 1 INJECTION, POWDER, FOR SOLUTION INTRAMUSCULAR; INTRAVENOUS at 11:54

## 2022-10-12 RX ADMIN — AZITHROMYCIN 500 MG: 500 INJECTION, POWDER, LYOPHILIZED, FOR SOLUTION INTRAVENOUS at 12:41

## 2022-10-12 RX ADMIN — ASPIRIN 81 MG: 81 TABLET, COATED ORAL at 08:17

## 2022-10-12 RX ADMIN — LEVOTHYROXINE SODIUM 112 MCG: 0.11 TABLET ORAL at 08:17

## 2022-10-12 RX ADMIN — ENOXAPARIN SODIUM 40 MG: 100 INJECTION SUBCUTANEOUS at 08:17

## 2022-10-12 RX ADMIN — IPRATROPIUM BROMIDE AND ALBUTEROL SULFATE 1 AMPULE: .5; 3 SOLUTION RESPIRATORY (INHALATION) at 20:49

## 2022-10-12 RX ADMIN — LACTULOSE 30 G: 20 SOLUTION ORAL at 12:25

## 2022-10-12 RX ADMIN — IPRATROPIUM BROMIDE AND ALBUTEROL SULFATE 1 AMPULE: .5; 3 SOLUTION RESPIRATORY (INHALATION) at 12:36

## 2022-10-12 RX ADMIN — IPRATROPIUM BROMIDE AND ALBUTEROL SULFATE 1 AMPULE: .5; 3 SOLUTION RESPIRATORY (INHALATION) at 08:06

## 2022-10-12 RX ADMIN — SODIUM CHLORIDE, PRESERVATIVE FREE 10 ML: 5 INJECTION INTRAVENOUS at 08:18

## 2022-10-12 ASSESSMENT — PAIN DESCRIPTION - LOCATION
LOCATION: HIP
LOCATION: HIP

## 2022-10-12 ASSESSMENT — PAIN SCALES - GENERAL
PAINLEVEL_OUTOF10: 8
PAINLEVEL_OUTOF10: 7

## 2022-10-12 ASSESSMENT — PAIN DESCRIPTION - DESCRIPTORS
DESCRIPTORS: ACHING
DESCRIPTORS: ACHING

## 2022-10-12 ASSESSMENT — PAIN DESCRIPTION - ORIENTATION
ORIENTATION: LEFT
ORIENTATION: LEFT

## 2022-10-12 ASSESSMENT — PAIN DESCRIPTION - FREQUENCY: FREQUENCY: CONTINUOUS

## 2022-10-12 NOTE — CARE COORDINATION
Case Management Initial Discharge Plan  Yadi Kyle             Met with:patient to discuss discharge plans. Information verified: address, contacts, phone number, , and insurance: Yes  Insurance Provider: Primary:  Payor: [employer] / Plan: [employer] HMO / Product Type: *No Product type* /                                         Emergency Contact/Next of Kin name & number: verified  Who are involved in patient's support system? Wife, children    PCP: Swetha Paulson MD  Date of last visit: 10/06/21    Discharge Planning    Living Arrangements:  Spouse/Significant Other     Home has 1 story  stairs to climb to get into front door. Basement. Bathroom/bedroom on main floor    Patient able to perform ADL's:Independent    Current Services (outpatient & in home) none    Is patient receiving oral anticoagulation therapy? No    Potential Assistance Needed:       Patient agreeable to home care: n/a  Freedom of choice provided:  no    Agreeable to SNF/Rehab: n/a  [place] of choice provided: no      Expected Discharge date:       Patient expects to be discharged to: If home: is the family and/or caregiver wiling & able to provide support at home? yes  Who will be providing this support? wife    Follow Up Appointment: Best Day/ Time:      Transportation provider: family  Transportation arrangements needed for discharge: No    Readmission Risk              Risk of Unplanned Readmission:  10             Does patient have a readmission risk score greater than 20?: No  If yes, follow-up appointment must be made within 7 days of discharge.      Goals of Care:       Educated patient on transitional options and is agreeable with plan      Discharge Plan: home without needs          Electronically signed by Mariano Bosworth, RN on 10/12/22 at 1:11 PM EDT

## 2022-10-12 NOTE — PROGRESS NOTES
Physical Therapy  Facility/Department: [facility]  Physical Therapy Initial Assessment    Name: Rusty Joel  : 1940  MRN: [id number]  Date of Service: 10/12/2022    Discharge Recommendations:  Home with Home health PT, Home with assist PRN, Continue to assess pending progress          Patient Diagnosis(es): The primary encounter diagnosis was Pneumonia of both lungs due to infectious organism, unspecified part of lung. A diagnosis of Hypothyroidism, unspecified type was also pertinent to this visit. Past Medical History:  has a past medical history of Cellulitis, Chronic left SI joint pain, Diastasis recti, Hip bursitis, Left hip pain, Left leg pain, Osteoarthritis of spine, Pars defect of lumbar spine, Somatic dysfunction of lumbar region, and Ventral hernia. Past Surgical History:  has a past surgical history that includes Colonoscopy (2013); Cholecystectomy (2012); and hernia repair (2010). Assessment   Body Structures, Functions, Activity Limitations Requiring Skilled Therapeutic Intervention: Decreased functional mobility ; Decreased ADL status; Decreased strength;Decreased endurance;Decreased balance  Therapy Prognosis: Good  Decision Making: Low Complexity  Activity Tolerance  Activity Tolerance: Patient tolerated treatment well     Plan   Physcial Therapy Plan  General Plan: 1 time a day 7 days a week  Current Treatment Recommendations: Strengthening, Balance training, Functional mobility training, Transfer training, ADL/Self-care training, Gait training, Neuromuscular re-education, Home exercise program, Safety education & training, Patient/Caregiver education & training, Equipment evaluation, education, & procurement, Therapeutic activities  Safety Devices  Type of Devices: Nurse notified, Gait belt, Call light within reach     Restrictions        Subjective   General  Chart Reviewed: Yes  Family / Caregiver Present: No  Referring Practitioner: Cristal Seymour MD  Diagnosis: Generalized weakness  Follows Commands: Within Functional Limits  Subjective  Subjective: Patient in bed upon arrival and agreeable to PT colleen.         Social/Functional History  Social/Functional History  Lives With: Spouse  Type of Home: House  Home Layout: One level (with basement - 14 stairs without a handrail)  Home Access: Stairs to enter with rails  Entrance Stairs - Number of Steps: 2  Entrance Stairs - Rails: Right  Bathroom Shower/Tub: Tub/Shower unit  Bathroom Toilet: Standard  Receives Help From: Family  ADL Assistance: Independent  Homemaking Assistance: Independent  Homemaking Responsibilities: Yes (Wife completes most, but he helps out some.)  Ambulation Assistance: Independent  Transfer Assistance: Independent  Active : Yes  Mode of Transportation: Truck, SUV  Vision/Hearing  Vision  Vision: Within Functional Limits  Hearing  Hearing: Within functional limits    Cognition   Orientation  Overall Orientation Status: Within Normal Limits     Objective   Heart Rate: 76  BP: (!) 101/56  BP Location: Left upper arm  BP Method: Automatic  Patient Position: Semi fowlers  MAP (Calculated): 71  Resp: 18  SpO2: 91 %  O2 Device: None (Room air)              AROM RLE (degrees)  RLE AROM: WFL  AROM LLE (degrees)  LLE AROM : WFL  Strength RLE  Comment: Grossly 4/5  Strength LLE  Comment: Grossly 4/5           Bed mobility  Supine to Sit: Stand by assistance  Sit to Supine: Stand by assistance  Scooting: Stand by assistance  Transfers  Sit to Stand: Minimal Assistance  Stand to Sit: Contact guard assistance  Ambulation  Surface: Level tile  Device: Rolling Walker  Assistance: Minimal assistance  Gait Deviations: Slow Tracie;Decreased step length  Distance: 10'  Comments: Min A x1 due to near LOB     Balance  Sitting - Static: Good  Sitting - Dynamic: Good  Standing - Static: Fair;+  Standing - Dynamic: Fair              Goals  Short Term Goals  Time Frame for Short Term Goals: Length of stay  Short Term Goal 1: Patient will complete bed mobility with FIDELIA assistance  Short Term Goal 2: Patient will complete transfers with FIDELIA assistance  Short Term Goal 3: Patient will ambulate 48' with SBA and RW              Therapy Time   Individual Concurrent Group Co-treatment   Time In [address]         Time Out 0145         Minutes Jim Martinez, PT

## 2022-10-12 NOTE — CONSULTS
[facility]    Consult                        Today's Date: 10/12/2022  Patient Name: Bismark Zaldivar  Date of admission: 10/11/2022  8:44 AM  Patient's age: [de-identified] y.o., 1940  Admission Dx: Pneumonia of both lungs due to infectious organism, unspecified part of lung [J18.9]  Hypothyroidism, unspecified type [E03.9]  Community acquired bilateral lower lobe pneumonia [J18.9]    Reason for Consult:  Cardiac evaluation    Requesting Physician: Jc Yao MD    CHIEF COMPLAINT:  Fatigue    History Obtained From:  patient, electronic medical record    HISTORY OF PRESENT ILLNESS:      The patient is a [de-identified] y.o.  male who is admitted to the hospital for weakness and fatigue. Patient denies any chest pain or dyspnea. He has history of hypothyroidism and not taking levothyroxine. TSH was 200. Chest xray concerning for PNA. Cardiology consulted for mild flat HS troponin elevation. Past Medical History:   has a past medical history of Cellulitis, Chronic left SI joint pain, Diastasis recti, Hip bursitis, Left hip pain, Left leg pain, Osteoarthritis of spine, Pars defect of lumbar spine, Somatic dysfunction of lumbar region, and Ventral hernia. Past Surgical History:   has a past surgical history that includes Colonoscopy (12/13/2013); Cholecystectomy (02/22/2012); and hernia repair (12/20/2010). Home Medications:    Prior to Admission medications    Medication Sig Start Date End Date Taking?  Authorizing Provider   atorvastatin (LIPITOR) 10 MG tablet take 1 tablet by mouth at bedtime  Patient not taking: Reported on 10/11/2022 10/5/21  Yes Historical Provider, MD   colchicine (COLCRYS) 0.6 MG tablet TAKE 2 TABLETS BY MOUTH NOW, THEN 1 TABLET ONE HOUR LATER, THEN 1 TABLET DAILY THEREAFTER UNTIL RESOLVED  Patient not taking: Reported on 10/11/2022 10/5/21  Yes Historical Provider, MD   Misc Natural Products (OSTEO BI-FLEX TRIPLE STRENGTH PO) Take 1 tablet by mouth daily    Historical Provider, MD   Multiple Vitamins-Minerals ([address] FORMULA PO) Take 1 tablet by mouth daily  Patient not taking: Reported on 10/11/2022    Historical Provider, MD   aspirin 81 MG tablet Take 81 mg by mouth daily. Historical Provider, MD   levothyroxine (SYNTHROID) 112 MCG tablet   Take 112 mcg by mouth daily   Patient not taking: Reported on 10/11/2022    Historical Provider, MD       Allergies:  Patient has no known allergies. Social History:   reports that he has never smoked. He has never used smokeless tobacco. He reports that he does not drink alcohol and does not use drugs. Family History: family history includes Cancer (age of onset: 58) in his mother; Heart Disease in his father; Other in his father. No h/o sudden cardiac death. No for premature CAD    REVIEW OF SYSTEMS:    Constitutional: there has been no unanticipated weight loss. There's been No change in energy level, No change in activity level. Eyes: No visual changes or diplopia. No scleral icterus. ENT: No Headaches, hearing loss or vertigo. No mouth sores or sore throat. Cardiovascular: see above  Respiratory: see above  Gastrointestinal: No abdominal pain, appetite loss, blood in stools. Genitourinary: No dysuria, trouble voiding, or hematuria. Musculoskeletal:  No gait disturbance, No weakness or joint complaints. Integumentary: No rash or pruritis. Neurological: No headache or diplopia. No tingling  Psychiatric: No anxiety, or depression. Endocrine: feeling chilly  Hematologic/Lymphatic: No abnormal bruising or bleeding, blood clots or swollen lymph nodes. Allergic/Immunologic: No nasal congestion or hives.       PHYSICAL EXAM:      /71   Pulse 73   Temp 97.4 °F (36.3 °C) (Tympanic)   Resp 18   Ht 5' 8.5\" (1.74 m)   Wt 195 lb 6.4 oz (88.6 kg)   SpO2 93%   BMI 29.28 kg/m²    Constitutional and General Appearance: alert, cooperative, no distress and appears stated age  HEENT: PERRL, no cervical lymphadenopathy. No masses palpable. Normal oral mucosa  Respiratory:  Normal excursion and expansion without use of accessory muscles  Resp Auscultation: Good respiratory effort. No for increased work of breathing. On auscultation: clear to auscultation bilaterally  Cardiovascular:  Heart tones are crisp and normal. regular S1 and S2.  Jugular venous pulsation Normal  The carotid upstroke is normal in amplitude and contour without delay or bruit   Abdomen:   soft  Bowel sounds present  Extremities:   No edema  Neurological:  Alert and oriented. DATA:    Diagnostics:    EKG: SR,  inferior infarction    TTE 10/11/22  Summary   Normal left ventricular diameter. Mild left ventricular hypertrophy. Left ventricular ejection fraction 60 %. No doppler evidence of diastolic dysfunction. Mild right ventricular dilatation with normal systolic function. Aortic valve leaflet calcification with adequate opening. Mitral annular calcification. Trivial mitral regurgitation. Normal function of other valves. No pericardial effusion. Labs:     CBC:   Recent Labs     10/11/22  0856 10/12/22  0524   WBC 6.0 6.2   HGB 10.5* 9.1*   HCT 31.9* 27.1*    210     BMP:   Recent Labs     10/11/22  0856 10/11/22  1654 10/12/22  0524     --  139   K 3.6* 3.4* 3.7   CO2 28  --  28   BUN 15  --  12   CREATININE 1.29*  --  1.26*   LABGLOM 56*  --  58*   GLUCOSE 93  --  85     BNP: No results for input(s): BNP in the last 72 hours. PT/INR: No results for input(s): PROTIME, INR in the last 72 hours. APTT:No results for input(s): APTT in the last 72 hours. CARDIAC ENZYMES:No results for input(s): CKTOTAL, CKMB, CKMBINDEX, TROPONINI in the last 72 hours.   FASTING LIPID PANEL:No results found for: HDL, LDLDIRECT, LDLCALC, TRIG  LIVER PROFILE:  Recent Labs     10/11/22  0856   AST 51*   ALT 37   LABALBU 4.4       IMPRESSION:    Patient Active Problem List   Diagnosis    Pneumonia of both lungs due to infectious organism       PNA  Preserved LV systolic function with no wall motion abnormality on TTE 10/11/22  Severe hypothyroidism  CKD  Anemia  Mild flat HS trop elevation    RECOMMENDATIONS:  Management of PNA per primary team  Management of hypothyroidism per primary team  Continue ASA  Doubt ACS- HS flat troponin elevation likely secondary to PNA, CKD      Discussed with patient and Nurse.     Tomy Mckeon [address] Cardiology Consult           [phone]

## 2022-10-12 NOTE — FLOWSHEET NOTE
Received bedside report from night shift nurse, Molecular Imaging. Checked on and introduced myself to patient. Patient was quiet in bed with unlabored breathing.      Himanshu Ocampo, Student Nurse   [address]

## 2022-10-12 NOTE — PROGRESS NOTES
Occupational Therapy          OT colleen attempted this afternoon, patient declined stating too tired and would prefer to wait until tomorrow morning.

## 2022-10-12 NOTE — FLOWSHEET NOTE
Reported off to day shift nurse, Greg Ray. Patient is currently working with PT.      Abbi Parson, student nurse  [address] [facility]

## 2022-10-13 ENCOUNTER — APPOINTMENT (OUTPATIENT)
Dept: GENERAL RADIOLOGY | Age: 82
DRG: 195 | End: 2022-10-13
Payer: COMMERCIAL

## 2022-10-13 VITALS
RESPIRATION RATE: 20 BRPM | HEART RATE: 80 BPM | OXYGEN SATURATION: 94 % | WEIGHT: 195.4 LBS | HEIGHT: 69 IN | SYSTOLIC BLOOD PRESSURE: 108 MMHG | BODY MASS INDEX: 28.94 KG/M2 | TEMPERATURE: 98 F | DIASTOLIC BLOOD PRESSURE: 58 MMHG

## 2022-10-13 LAB
ABSOLUTE EOS #: 0.53 K/UL (ref 0–0.44)
ABSOLUTE IMMATURE GRANULOCYTE: 0.06 K/UL (ref 0–0.3)
ABSOLUTE LYMPH #: 1.35 K/UL (ref 1.1–3.7)
ABSOLUTE MONO #: 0.43 K/UL (ref 0.1–1.2)
ANION GAP SERPL CALCULATED.3IONS-SCNC: 7 MMOL/L (ref 9–17)
BASOPHILS # BLD: 1 % (ref 0–2)
BASOPHILS ABSOLUTE: 0.08 K/UL (ref 0–0.2)
BUN BLDV-MCNC: 9 MG/DL (ref 8–23)
BUN/CREAT BLD: 7 (ref 9–20)
CALCIUM SERPL-MCNC: 9.1 MG/DL (ref 8.6–10.4)
CHLORIDE BLD-SCNC: 105 MMOL/L (ref 98–107)
CO2: 28 MMOL/L (ref 20–31)
CREAT SERPL-MCNC: 1.31 MG/DL (ref 0.7–1.2)
EOSINOPHILS RELATIVE PERCENT: 9 % (ref 1–4)
GFR SERPL CREATININE-BSD FRML MDRD: 55 ML/MIN/1.73M2
GLUCOSE BLD-MCNC: 87 MG/DL (ref 70–99)
HCT VFR BLD CALC: 29.7 % (ref 40.7–50.3)
HEMOGLOBIN: 9.9 G/DL (ref 13–17)
IMMATURE GRANULOCYTES: 1 %
LYMPHOCYTES # BLD: 23 % (ref 24–43)
MCH RBC QN AUTO: 32.1 PG (ref 25.2–33.5)
MCHC RBC AUTO-ENTMCNC: 33.3 G/DL (ref 25.2–33.5)
MCV RBC AUTO: 96.4 FL (ref 82.6–102.9)
MONOCYTES # BLD: 7 % (ref 3–12)
NRBC AUTOMATED: 0 PER 100 WBC
PDW BLD-RTO: 15.7 % (ref 11.8–14.4)
PLATELET # BLD: 219 K/UL (ref 138–453)
PMV BLD AUTO: 9.2 FL (ref 8.1–13.5)
POTASSIUM SERPL-SCNC: 3.9 MMOL/L (ref 3.7–5.3)
RBC # BLD: 3.08 M/UL (ref 4.21–5.77)
RBC # BLD: ABNORMAL 10*6/UL
SEG NEUTROPHILS: 59 % (ref 36–65)
SEGMENTED NEUTROPHILS ABSOLUTE COUNT: 3.44 K/UL (ref 1.5–8.1)
SODIUM BLD-SCNC: 140 MMOL/L (ref 135–144)
THYROXINE, FREE: <0.1 NG/DL (ref 0.93–1.7)
WBC # BLD: 5.9 K/UL (ref 3.5–11.3)

## 2022-10-13 PROCEDURE — 97116 GAIT TRAINING THERAPY: CPT | Performed by: PHYSICAL THERAPY ASSISTANT

## 2022-10-13 PROCEDURE — 2580000003 HC RX 258: Performed by: INTERNAL MEDICINE

## 2022-10-13 PROCEDURE — 6360000002 HC RX W HCPCS: Performed by: INTERNAL MEDICINE

## 2022-10-13 PROCEDURE — 99238 HOSP IP/OBS DSCHRG MGMT 30/<: CPT | Performed by: INTERNAL MEDICINE

## 2022-10-13 PROCEDURE — 6370000000 HC RX 637 (ALT 250 FOR IP): Performed by: INTERNAL MEDICINE

## 2022-10-13 PROCEDURE — 94761 N-INVAS EAR/PLS OXIMETRY MLT: CPT

## 2022-10-13 PROCEDURE — 71045 X-RAY EXAM CHEST 1 VIEW: CPT

## 2022-10-13 PROCEDURE — 85025 COMPLETE CBC W/AUTO DIFF WBC: CPT

## 2022-10-13 PROCEDURE — 94640 AIRWAY INHALATION TREATMENT: CPT

## 2022-10-13 PROCEDURE — 80048 BASIC METABOLIC PNL TOTAL CA: CPT

## 2022-10-13 PROCEDURE — 36415 COLL VENOUS BLD VENIPUNCTURE: CPT

## 2022-10-13 RX ORDER — GREEN TEA/HOODIA GORDONII 315-12.5MG
1 CAPSULE ORAL 3 TIMES DAILY
Qty: 30 TABLET | Refills: 0 | COMMUNITY
Start: 2022-10-13 | End: 2022-10-23

## 2022-10-13 RX ORDER — ALBUTEROL SULFATE 90 UG/1
AEROSOL, METERED RESPIRATORY (INHALATION)
Qty: 18 G | Refills: 0 | Status: SHIPPED | OUTPATIENT
Start: 2022-10-13 | End: 2022-11-12

## 2022-10-13 RX ORDER — AZITHROMYCIN 500 MG/1
500 TABLET, FILM COATED ORAL DAILY
Qty: 1 PACKET | Refills: 0 | Status: SHIPPED | OUTPATIENT
Start: 2022-10-13 | End: 2022-10-18

## 2022-10-13 RX ORDER — CEFDINIR 300 MG/1
300 CAPSULE ORAL 2 TIMES DAILY
Qty: 8 CAPSULE | Refills: 0 | Status: SHIPPED | OUTPATIENT
Start: 2022-10-13 | End: 2022-10-17

## 2022-10-13 RX ADMIN — IPRATROPIUM BROMIDE AND ALBUTEROL SULFATE 1 AMPULE: .5; 3 SOLUTION RESPIRATORY (INHALATION) at 09:00

## 2022-10-13 RX ADMIN — ENOXAPARIN SODIUM 40 MG: 100 INJECTION SUBCUTANEOUS at 09:05

## 2022-10-13 RX ADMIN — LEVOTHYROXINE SODIUM 112 MCG: 0.11 TABLET ORAL at 09:04

## 2022-10-13 RX ADMIN — AZITHROMYCIN 500 MG: 500 INJECTION, POWDER, LYOPHILIZED, FOR SOLUTION INTRAVENOUS at 12:46

## 2022-10-13 RX ADMIN — SODIUM CHLORIDE, PRESERVATIVE FREE 10 ML: 5 INJECTION INTRAVENOUS at 09:04

## 2022-10-13 RX ADMIN — CEFTRIAXONE 1000 MG: 1 INJECTION, POWDER, FOR SOLUTION INTRAMUSCULAR; INTRAVENOUS at 12:14

## 2022-10-13 RX ADMIN — LACTULOSE 30 G: 20 SOLUTION ORAL at 09:04

## 2022-10-13 RX ADMIN — ASPIRIN 81 MG: 81 TABLET, COATED ORAL at 09:04

## 2022-10-13 RX ADMIN — IPRATROPIUM BROMIDE AND ALBUTEROL SULFATE 1 AMPULE: .5; 3 SOLUTION RESPIRATORY (INHALATION) at 12:35

## 2022-10-13 NOTE — PROGRESS NOTES
10/13/2022 05:30 AM    LABALBU 4.4 10/11/2022 08:56 AM    CREATININE 1.31 10/13/2022 05:30 AM    CALCIUM 9.1 10/13/2022 05:30 AM    LABGLOM 55 10/13/2022 05:30 AM    GLUCOSE 87 10/13/2022 05:30 AM           Physical Exam:  Vitals: BP (!) 108/58   Pulse 80   Temp 98 °F (36.7 °C) (Tympanic)   Resp 20   Ht 5' 8.5\" (1.74 m)   Wt 195 lb 6.4 oz (88.6 kg)   SpO2 94%   BMI 29.28 kg/m²   24 hour intake/output:  Intake/Output Summary (Last 24 hours) at 10/13/2022 1238  Last data filed at 10/12/2022 1800  Gross per 24 hour   Intake 480 ml   Output --   Net 480 ml     Last 3 weights: Wt Readings from Last 3 Encounters:   10/11/22 195 lb 6.4 oz (88.6 kg)   12/18/17 200 lb (90.7 kg)   06/26/15 210 lb 6.4 oz (95.4 kg)     HEENT: Normocephalic and Atraumatic  Neck: Supple, No Masses, Tenderness, Nodularity, and No Lymphadenopathy  Chest/Lungs: Distant Breath Sounds  Cardiac: Regular Rate and Rhythm  GI/Abdomen: Bowel Sounds Present and Soft, Non-tender, without Guarding or Rebound Tenderness  : Not examined  EXT/Skin: No Edema, No Cyanosis, and No Clubbing  Neuro:  alert--generalized weakness --known gait-balance instability      Assessment:    Principal Problem:    Pneumonia of both lungs due to infectious organism  Resolved Problems:    * No resolved hospital problems.  *    BEAN BLANK  80 WM   [JAMIE Holloway----[place]]  FULL CODE   COVID-19---NEGATIVE    Anti-infectives:    Rocephin IV, Zithromax IV    Pneumonia---PNA----bilateral bibasilar---10.11.2022  Hypoxia----10.11.2022  Elevated troponin----10.11.2022----downward course---56---53  Weakness--fatigue           CXR--10.13.2022---LLL discoid atelectasis slightly improved           2D ECHO----10.11.2022--mild LVH--NLVSF--RV dilatation---                                    NRVSF---MAC---trivial MR--AV calcification with adequate opening---                                   trivial TR---RVSP ~ 35 mm Hg---normal AR 3.7 cm---IVC not visualized--- no DD---LVEF ~ 60%          EKG----10.111.2022----NSR---67--1st degree AVB--incomplete RBBB--no change from 2016          CXR----10.11.2022-----NACs          CT head---10.11.2022---no MBS--remote tiny lacunar infarction left thalamus           CTA chest--pulmonary---10.11.2022---no PE--mild bronchial wall thickening---scattered                                 interstitial opacities bilateral lower lobes---hepatic cysts--borderline enlarged                                 right hilar LN--likely reactive----mildly patulous esophagus    Hypothyroidism---uncontrolled---TSH ~ 200----10.11.2022  Cerebrovascular disease          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   Difficulty walking and speaking--due to above   CKD---Stage 3a  Hyperlipidemia  PMH:   cellulitis, chronic left SIJ pain, diastasis recti, hip bursitis, left hip-leg pain,               OA spine, pars defect lumbar spine, somatic dysfunction lumbar region,              ventral hernia, hyperuricemia--gout   PSH:    colonoscopy----2013, cholecystectomy--2012, hernia repair---ventral---2010    Allergies:   NKDA         Plan:     Home----10.13.2022     Medications reviewed     IV --> PO Omnicef--Zithromax      Hypothyroidism---Synthroid 112 daily----recheck FT4--TSH     Hyperlipidemia--Lipitor 10 daily     Probiotics     Follow up Dr. Baljeet Chambers, JAMIE---[place]     See orders     Electronically signed by Ila Fisher MD on 10/13/2022 at 12:38 PM    Hospitalist

## 2022-10-13 NOTE — PROGRESS NOTES
CLINICAL PHARMACY NOTE: MEDS TO BEDS    Total # of Prescriptions Filled: 3   The following medications were delivered to the patient:  Azithromycin 500  Cefdinir 300  Albuterol HFA    Additional Documentation:

## 2022-10-13 NOTE — DISCHARGE INSTR - DIET

## 2022-10-13 NOTE — PROGRESS NOTES
Hospitalist Progress Note    Patient:  Margo Bowling     YOB: 1940    MRN: [id number]   Admit date: 10/11/2022     Acct: [de-identified]     PCP: Lara Bright MD    CC--Interval History:     PNA--bibasilar---on Rocephin-Zithromax---med nebs---steroids----supplemental O2 PRN---mproved--able to be on RA    Elevated troponin--seen by Cardiology---no cardiac intervention    Constipation----lactulose    Hypothyroidism----back on Synthroid    Dementia--CVA history    See note below    All other ROS negative except noted in HPI    Diet:  ADULT DIET;  Regular    Medications:  Scheduled Meds:   lactulose  30 g Oral 4x daily    sodium chloride flush  10 mL IntraVENous 2 times per day    enoxaparin  40 mg SubCUTAneous Daily    ipratropium-albuterol  1 ampule Inhalation Q4H WA    azithromycin  500 mg IntraVENous Q24H    cefTRIAXone (ROCEPHIN) IV  1,000 mg IntraVENous Q24H    aspirin  81 mg Oral Daily    atorvastatin  10 mg Oral Nightly    levothyroxine  112 mcg Oral Daily     Continuous Infusions:   sodium chloride       PRN Meds:sodium chloride flush, sodium chloride, acetaminophen, sodium chloride nebulizer, albuterol, ondansetron    Objective:  Labs:  CBC with Differential:    Lab Results   Component Value Date/Time    WBC 6.2 10/12/2022 05:24 AM    RBC 2.80 10/12/2022 05:24 AM    HGB 9.1 10/12/2022 05:24 AM    HCT 27.1 10/12/2022 05:24 AM     10/12/2022 05:24 AM    MCV 96.8 10/12/2022 05:24 AM    MCH 32.5 10/12/2022 05:24 AM    MCHC 33.6 10/12/2022 05:24 AM    RDW 15.4 10/12/2022 05:24 AM    LYMPHOPCT 22 10/12/2022 05:24 AM    MONOPCT 7 10/12/2022 05:24 AM    BASOPCT 1 10/12/2022 05:24 AM    MONOSABS 0.40 10/12/2022 05:24 AM    LYMPHSABS 1.38 10/12/2022 05:24 AM    EOSABS 0.41 10/12/2022 05:24 AM    BASOSABS 0.07 10/12/2022 05:24 AM     BMP:    Lab Results   Component Value Date/Time     10/12/2022 05:24 AM    K 3.7 10/12/2022 05:24 AM     10/12/2022 05:24 AM    CO2 28 10/12/2022 05:24 AM BUN 12 10/12/2022 05:24 AM    LABALBU 4.4 10/11/2022 08:56 AM    CREATININE 1.26 10/12/2022 05:24 AM    CALCIUM 9.0 10/12/2022 05:24 AM    LABGLOM 58 10/12/2022 05:24 AM    GLUCOSE 85 10/12/2022 05:24 AM           Physical Exam:  Vitals: /77   Pulse 70   Temp 97.9 °F (36.6 °C) (Tympanic)   Resp 18   Ht 5' 8.5\" (1.74 m)   Wt 195 lb 6.4 oz (88.6 kg)   SpO2 91%   BMI 29.28 kg/m²   24 hour intake/output:  Intake/Output Summary (Last 24 hours) at 10/12/2022 2024  Last data filed at 10/12/2022 1800  Gross per 24 hour   Intake 600 ml   Output 850 ml   Net -250 ml     Last 3 weights: Wt Readings from Last 3 Encounters:   10/11/22 195 lb 6.4 oz (88.6 kg)   12/18/17 200 lb (90.7 kg)   06/26/15 210 lb 6.4 oz (95.4 kg)     HEENT: Normocephalic and Atraumatic  Neck: Supple, No Masses, Tenderness, Nodularity, and No Lymphadenopathy  Chest/Lungs: Distant Breath Sounds  Cardiac: Regular Rate and Rhythm  GI/Abdomen: Bowel Sounds Present and Soft, Non-tender, without Guarding or Rebound Tenderness  : Not examined  EXT/Skin: No Edema, No Cyanosis, and No Clubbing  Neuro:  alert--generalieed weakness      Assessment:    Principal Problem:    Pneumonia of both lungs due to infectious organism  Resolved Problems:    * No resolved hospital problems.  *    BEAN BLANK  80 WM   [JAMIE Holloway----[place]]  FULL CODE   COVID-19---NEGATIVE    Anti-infectives:    Rocephin IV, Zithromax IV    Pneumonia---PNA----bilateral bibasilar---10.11.2022  Hypoxia----10.11.2022  Elevated troponin----10.11.2022----downward course---56---53  Weakness--fatigue            2D ECHO----10.11.2022---preliminary only--mild LVH--NLVSF--RV dilatation---                                    NRVSF---MAC---trivial MR--AV calcification with adequate opening---                                   trivial TR---RVSP ~ 35 mm Hg---normal AR 3.7 cm---IVC not visualized---                                  no DD---LVEF ~ 60% EKG----10.111.2022----NSR---67--1st degree AVB--incomplete RBBB--no change from 2016          CXR----10.11.2022-----NACs          CT head---10.11.2022---no MBS--remote tiny lacunar infarction left thalamus           CTA chest--pulmonary---10.11.2022---no PE--mild bronchial wall thickening---scattered                                 interstitial opacities bilateral lower lobes---hepatic cysts--borderline enlarged                                 right hilar LN--likely reactive----mildly patulous esophagus    Hypothyroidism---uncontrolled---TSH ~ 200----10.11.2022  Cerebrovascular disease          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   Difficulty walking and speaking--due to above   CKD---Stage 3a  Hyperlipidemia  PMH:   cellulitis, chronic left SIJ pain, diastasis recti, hip bursitis, left hip-leg pain,               OA spine, pars defect lumbar spine, somatic dysfunction lumbar region,              ventral hernia, hyperuricemia--gout   PSH:    colonoscopy----2013, cholecystectomy--2012, hernia repair---ventral---2010    Allergies:   NKDA        Plan:     PNA---cont'd IV antibiotics---med nebs---steroids---O2 as required     Constipation---lactulose     No further cardiac intervention per Cardiology     See orders     Electronically signed by Jc Yao MD on 10/12/2022 at 8:24 PM    Hospitalist

## 2022-10-13 NOTE — PROGRESS NOTES
Physical Therapy  Facility/Department: [facility]  PROGRESSIVE CARE  Daily Treatment Note  NAME: Lisa Vazquez  : 1940  MRN: [id number]    Date of Service: 10/13/2022    Discharge Recommendations:  Home with Home health PT, Home with assist PRN, Continue to assess pending progress        Patient Diagnosis(es): The primary encounter diagnosis was Pneumonia of both lungs due to infectious organism, unspecified part of lung. A diagnosis of Hypothyroidism, unspecified type was also pertinent to this visit. Assessment   Activity Tolerance: Patient tolerated treatment well     Plan    Physcial Therapy Plan  General Plan: 1 time a day 7 days a week  Current Treatment Recommendations: Strengthening;Balance training;Functional mobility training;Transfer training;ADL/Self-care training;Gait training;Neuromuscular re-education;Home exercise program;Safety education & training;Patient/Caregiver education & training;Equipment evaluation, education, & procurement; Therapeutic activities     Restrictions        Subjective    Subjective  Subjective: Pt in bed at initiation of session. Pain: Pain 0/10  Orientation  Overall Orientation Status: Within Normal Limits     Objective   Vitals  O2 Device: None (Room air)  Bed Mobility Training  Bed Mobility Training: Yes  Supine to Sit: Supervision  Sit to Supine: Supervision  Scooting: Supervision  Transfer Training  Transfer Training: Yes  Sit to Stand: Stand-by assistance  Stand to Sit: Stand-by assistance  Gait Training  Gait Training: Yes  Gait  Overall Level of Assistance: Stand-by assistance;Contact-guard assistance  Speed/Tracie: Fluctuations  Swing Pattern: Left symmetrical;Right symmetrical  Gait Abnormalities: Antalgic  Distance (ft): 250 Feet  Assistive Device: Gait belt  Neuromuscular Education  Neuromuscular Education: No  NDT Treatment: Gait ;Standing; Lower extremity  PT Exercises  Resistive Exercises: LAQ, HS curls, hip abd, add x15 with manual resistance.      Safety Devices  Type of Devices: Nurse notified;Gait belt;Call light within reach; Left in chair       Goals  Short Term Goals  Time Frame for Short Term Goals: Length of stay  Short Term Goal 1: Patient will complete bed mobility with FIDELIA assistance  Short Term Goal 2: Patient will complete transfers with FIDELIA assistance  Short Term Goal 3: Patient will ambulate 48' with SBA and RW    Education       Therapy Time   Individual Concurrent Group Co-treatment   Time In 0841         Time Out 7889         Minutes [address] [place], [place]

## 2022-10-13 NOTE — PROGRESS NOTES
rounding on PCU. Assessment: Patient has a very positive attitude. He is looking forward to getting out and going home and mowing. Patient shared a lot about his outside interests. Intervention: Engaged in conversation and prayer. Patient expressed gratitude for visit and offer of continued prayer. Plan: Chaplains are available 24/7 to help with spiritual and emotional concerns.

## 2022-10-14 ENCOUNTER — CARE COORDINATION (OUTPATIENT)
Dept: CARE COORDINATION | Age: 82
End: 2022-10-14

## 2022-10-14 NOTE — DISCHARGE SUMMARY
Rah [address]                 [address] [place] [address], [address] [place] [address]                               DISCHARGE SUMMARY    PATIENT NAME: Bessy Scott                  :        1940  MED REC NO:   [id number]                             ROOM:       0219  ACCOUNT NO:   [de-identified]                           ADMIT DATE: 10/11/2022  PROVIDER:     Mendel Malay. Paula West MD                  DISCHARGE DATE:  10/13/2022    ATTENDING PHYSICIAN OF HOSPITALIZATION:  Noel Mckeon MD    PERSONAL PHYSICIAN:  Cristiano Patel. [facility]. DIAGNOSES:  1. Pneumonia, bilateral bibasilar, 10/11/2022. Hypoxia, 10/11/2022,  resolved. Elevated troponin, 10/11/2022, with a downward course, seen  by Cardiology, no intervention. 2.  Weakness, fatigue. A 2D echo, 10/11/2022, mild LVH, normal left  ventricular systolic function, RV dilatation, normal right ventricular  systolic function, MAC, trivial MR, AV calcification with adequate  opening, trivial TR, RVSP 35 mmHg, normal AR 3.7 cm, IVC not visualized,  no diastolic dysfunction, LVEF 50%. Chest x-ray, 10/13/2022, left lower  lobe discoid atelectasis, slightly improved. EKG, 10/11/2022, normal  sinus rhythm, rate 67, first-degree AV block, incomplete right  bundle-branch block, no change from 2016. Chest x-ray, 10/11/2022, no  acute changes. CT of the head, 10/11/2022, no mass, bleed or shift,  remote tiny lacunar infarction, left thalamus. CTA chest/pulmonary,  10/11/2022, no pulmonary embolus, mild bronchial wall thickening,  scattered interstitial opacities, bilateral lower lobes, hepatic cyst,  borderline enlarged right hilar lymph nodes, likely reactive, mild  patulous esophagus. 3.  Hypothyroidism, uncontrolled. The patient's TSH _____ on  10/11/2022, restarted on his thyroid medication, namely Synthroid 112  mcg (0.112 mg) p.o. daily.   4.  Cerebrovascular disease, CVA, with left thalamic infarct complete course;  cefdinir (Omnicef) 300 mg p.o. b.i.d., 5 days to complete course;  levothyroxine (Synthroid) 112 mcg (0.112 mg) p.o. daily, note that he  will require rechecking of his free T4, TSH in the outpatient setting;  probiotic acidophilus one 3 times a day. FOLLOWING MEDICATIONS CONTINUED WITHOUT CHANGE:  Aspirin 81 mg p.o.  daily; colchicine 0.6 mg tablets, two tablets with the onset of gout,  followed by 1 tablet later, one tablet daily until resolved; Ocuvite Eye  Formula one p.o. daily; Bi-Flex Triple Strength one p.o. daily OTC. Follow up with the patient's personal physician, Royer Boss. [facility]. Any aspect of the patient's care not discussed in the chart and/or  dictation will be addressed and treated as an outpatient. The patient's medications have been reviewed including, but not limited  to, pre-hospital, hospital and discharge medications. The patient  and/or the patient's personal representatives were specifically advised  the only medications to be taken are those set forth in the discharge  orders and no other medications should be taken. Any prior medications  not on the discharge orders are specifically discontinued.         Kenneth Aguilar MD    D: 10/13/2022 15:15:32       T: 10/13/2022 15:18:33     [email or web address]  Job#: [id number]     Doc#: [id number]    CC:

## 2022-10-16 LAB
CULTURE: NORMAL
CULTURE: NORMAL
SPECIMEN DESCRIPTION: NORMAL
SPECIMEN DESCRIPTION: NORMAL

## 2022-10-17 ENCOUNTER — CARE COORDINATION (OUTPATIENT)
Dept: CARE COORDINATION | Age: 82
End: 2022-10-17

## 2022-11-16 ENCOUNTER — TELEPHONE (OUTPATIENT)
Dept: SURGERY | Age: 82
End: 2022-11-16

## 2022-11-18 PROBLEM — M79.605 LEG PAIN, LEFT: Status: ACTIVE | Noted: 2022-11-18

## 2022-11-18 RX ORDER — NYSTATIN 100000 U/G
CREAM TOPICAL
COMMUNITY
Start: 2022-11-01

## 2022-11-18 RX ORDER — LEVOTHYROXINE SODIUM 0.1 MG/1
TABLET ORAL
COMMUNITY
Start: 2022-11-01

## 2023-03-23 ENCOUNTER — TELEPHONE (OUTPATIENT)
Dept: SURGERY | Age: 83
End: 2023-03-23

## 2023-03-28 VITALS
TEMPERATURE: 96.4 F | HEART RATE: 74 BPM | HEIGHT: 67 IN | BODY MASS INDEX: 29.34 KG/M2 | WEIGHT: 186.95 LBS | OXYGEN SATURATION: 92 % | DIASTOLIC BLOOD PRESSURE: 60 MMHG | SYSTOLIC BLOOD PRESSURE: 104 MMHG

## 2023-10-11 NOTE — CARE COORDINATION
Care Transitions Outreach Attempt #1    Call within 2 business days of discharge: Yes   Attempted to reach patient for transitions of care follow up. Unable to reach patient. HIPAA compliant message left on  requesting a return call. Patient: Teena Parker Patient : 1940 MRN: <[id number]>    Last Discharge [address]       Date Complaint Diagnosis Description Type Department Provider    10/11/22 Aphasia Pneumonia of both lungs due to infectious organism, unspecified part of lung . .. ED to Hosp-Admission (Discharged) (ADMITTED) Jc Berger MD; Aura Hanna. .. Was this an external facility discharge? No Discharge Facility: [facility]    Noted following upcoming appointments from discharge chart review:   [facility] follow up appointment(s): No future appointments.     JACKELYN Rowley  Health/ Care Transition Nurse  [phone] 

## 2024-04-12 ENCOUNTER — TELEPHONE (OUTPATIENT)
Dept: SURGERY | Age: 84
End: 2024-04-12

## 2024-04-12 ENCOUNTER — INITIAL CONSULT (OUTPATIENT)
Dept: SURGERY | Age: 84
End: 2024-04-12
Payer: COMMERCIAL

## 2024-04-12 VITALS
HEIGHT: 67 IN | WEIGHT: 196.6 LBS | DIASTOLIC BLOOD PRESSURE: 82 MMHG | SYSTOLIC BLOOD PRESSURE: 122 MMHG | OXYGEN SATURATION: 90 % | BODY MASS INDEX: 30.86 KG/M2 | HEART RATE: 71 BPM

## 2024-04-12 DIAGNOSIS — R19.5 CHANGE IN STOOL CALIBER: Primary | ICD-10-CM

## 2024-04-12 PROCEDURE — 1123F ACP DISCUSS/DSCN MKR DOCD: CPT | Performed by: SURGERY

## 2024-04-12 PROCEDURE — 99214 OFFICE O/P EST MOD 30 MIN: CPT | Performed by: SURGERY

## 2024-04-12 NOTE — TELEPHONE ENCOUNTER
[facility]  General Surgery - Dr. Batres   Phone # [phone]  Fax # [phone]    Patient:Pradeep Crum    :1940     Surgical/Procedure Planned: Colonoscopy     Date & Location: 24 @ [facility]        Outpatient   Planned Length of OR: 30 min.    Sedation: IV MAC     Estimated Cardiac Risk for Non-Cardiac Surgery/Procedure     Low______ Moderate______ High______    Medication Instructions - Clarification needed by this date: 24      ASA 81 mg       Hold ___ Days    Resume medications:      Signature of Provider Giving Orders for Medication holds:    _____________________________________________

## 2024-04-16 NOTE — TELEPHONE ENCOUNTER
Received ASA 81 mg hold via fax from PCP Dr. Kishore Dinero x 5 days prior to colonoscopy on 4/26/24 @ [facility] with Dr. Batres.     Contacted patient to inform him and patient voiced concerns with holding aspirin and states \" last time I held it I was in the hospital for 5 days because I had a stroke\".   Writer asked patient if he was sure it was just aspirin or if he was taking another anticoagulant medication and patient yelled \" it was aspirin 81 mg!!!\".     Reassured and informed patient writer would check with Dr. Batres to see if he can remain on aspirin prior to colonoscopy and patient voiced understanding.

## 2024-04-17 NOTE — TELEPHONE ENCOUNTER
Discussed with Dr. Batres in person and he is ok with patient remaining on aspirin 81 mg prior to colonoscopy. Advised there is a slight increased risk with bleeding if biopsies are indicated but if the patient wants to stay on the aspirin that is fine.     Contacted and informed patient and he voiced understanding.

## 2024-05-29 ENCOUNTER — TELEPHONE (OUTPATIENT)
Dept: SURGERY | Age: 84
End: 2024-05-29

## 2024-05-29 NOTE — TELEPHONE ENCOUNTER
Letter created and mailed to patient with results from recent colonoscopy at [facility] with Dr. Batres on 4-26-24. Updated history, health maintenance, and recall. Forwarded results to PCP.